# Patient Record
Sex: MALE | Race: WHITE | NOT HISPANIC OR LATINO | Employment: UNEMPLOYED | URBAN - METROPOLITAN AREA
[De-identification: names, ages, dates, MRNs, and addresses within clinical notes are randomized per-mention and may not be internally consistent; named-entity substitution may affect disease eponyms.]

---

## 2018-07-11 ENCOUNTER — OFFICE VISIT (OUTPATIENT)
Dept: FAMILY MEDICINE CLINIC | Facility: CLINIC | Age: 23
End: 2018-07-11

## 2018-07-11 VITALS
DIASTOLIC BLOOD PRESSURE: 80 MMHG | OXYGEN SATURATION: 99 % | HEART RATE: 71 BPM | HEIGHT: 70 IN | BODY MASS INDEX: 30.86 KG/M2 | WEIGHT: 215.6 LBS | SYSTOLIC BLOOD PRESSURE: 120 MMHG | RESPIRATION RATE: 16 BRPM | TEMPERATURE: 99.3 F

## 2018-07-11 DIAGNOSIS — M25.511 RIGHT SHOULDER PAIN, UNSPECIFIED CHRONICITY: Primary | ICD-10-CM

## 2018-07-11 PROCEDURE — 73030 X-RAY EXAM OF SHOULDER: CPT | Performed by: NURSE PRACTITIONER

## 2018-07-11 PROCEDURE — 99213 OFFICE O/P EST LOW 20 MIN: CPT | Performed by: NURSE PRACTITIONER

## 2018-07-11 RX ORDER — CETIRIZINE HYDROCHLORIDE 10 MG/1
10 TABLET ORAL DAILY
COMMUNITY

## 2018-07-11 RX ORDER — IBUPROFEN 800 MG/1
800 TABLET ORAL EVERY 6 HOURS PRN
Qty: 60 TABLET | Refills: 0 | Status: SHIPPED | OUTPATIENT
Start: 2018-07-11

## 2018-07-11 NOTE — PATIENT INSTRUCTIONS
Right shoulder xray today, will call with results. Copy given to patient.   Refer to ortho will call with appt.   Encouraged rest, ice, ibuprofen 800mg every 8 hours as needed for pain, avoid OTC NSAIDS.   Avoid over head or heavy lifting.   Increase fluid intake, get plenty of rest.   Patient agrees with plan of care and understands instructions. Call if worsening symptoms or any problems or concerns.

## 2018-07-16 ENCOUNTER — TELEPHONE (OUTPATIENT)
Dept: FAMILY MEDICINE CLINIC | Facility: CLINIC | Age: 23
End: 2018-07-16

## 2018-07-17 ENCOUNTER — TELEPHONE (OUTPATIENT)
Dept: FAMILY MEDICINE CLINIC | Facility: CLINIC | Age: 23
End: 2018-07-17

## 2018-07-17 NOTE — TELEPHONE ENCOUNTER
----- Message from MADDIE Archer sent at 7/16/2018  8:18 AM EDT -----  Please call patient with results. Shoulder xray is normal, cont f/u with ortho.    [facility] informed of Xray results

## 2022-06-30 NOTE — PROGRESS NOTES
Subjective   Javad Bedoya is a 22 y.o. male.     History of Present Illness   C/o right shoulder pain, started about 2 years ago in high school. Tried rest ice ibuprofen, he states he started lifting weights on regular program, he states chest press and shoulder press causes pain, started back a couple of weeks ago, does not see ortho. He states hurts to lift certain things. He left handed. He has dull pain with movement. He has tried shoulder exercises but does not help. He played football in high school. He states arm bent back and twisted shoulder.     The following portions of the patient's history were reviewed and updated as appropriate: allergies, current medications, past family history, past medical history, past social history, past surgical history and problem list.    Review of Systems   Constitutional: Negative for chills, diaphoresis and fever.   Respiratory: Negative for cough and shortness of breath.    Cardiovascular: Negative for chest pain.   Musculoskeletal: Positive for arthralgias and myalgias.   Skin: Negative for pallor.   Neurological: Negative for dizziness, light-headedness and headache.   All other systems reviewed and are negative.      Objective   Physical Exam   Constitutional: He is oriented to person, place, and time. He appears well-developed and well-nourished.   HENT:   Head: Normocephalic.   Eyes: Pupils are equal, round, and reactive to light.   Neck: Normal range of motion.   Cardiovascular: Normal rate, regular rhythm, normal heart sounds and intact distal pulses.    Pulmonary/Chest: Effort normal and breath sounds normal.   Musculoskeletal:        Right shoulder: He exhibits decreased range of motion, tenderness, bony tenderness and decreased strength. He exhibits no swelling, no effusion, no crepitus, no deformity, no pain, no spasm and normal pulse.        Left shoulder: Normal.   Lymphadenopathy:     He has no cervical adenopathy.   Neurological: He is alert and oriented to  person, place, and time.   Skin: Skin is warm and dry.   Psychiatric: He has a normal mood and affect. His behavior is normal.   Nursing note and vitals reviewed.    Right shoulder xray today for pain, no comparison, shows NAD, awaiting radiology over read.     Assessment/Plan   Javad was seen today for shoulder pain.    Diagnoses and all orders for this visit:    Right shoulder pain, unspecified chronicity  -     XR Shoulder 2+ View Right (In Office)  -     Ambulatory Referral to Orthopedic Surgery    Other orders  -     ibuprofen (ADVIL,MOTRIN) 800 MG tablet; Take 1 tablet by mouth Every 6 (Six) Hours As Needed for Mild Pain .      Right shoulder xray today, will call with results. Copy given to patient.   Refer to ortho will call with appt.   Encouraged rest, ice, ibuprofen 800mg every 8 hours as needed for pain, avoid OTC NSAIDS.   Avoid over head or heavy lifting.   Increase fluid intake, get plenty of rest.   Patient agrees with plan of care and understands instructions. Call if worsening symptoms or any problems or concerns.             Anesthesia Volume In Cc: 6

## 2025-03-21 NOTE — TELEPHONE ENCOUNTER
Problem: Postoperative Care  Goal: Elimination status is maintained/returned to baseline  Outcome: Monitoring/Evaluating progress  Goal: Activity level is resumed to level needed for d/c  Outcome: Monitoring/Evaluating progress  Goal: Verbalizes understanding of postoperative care in the hospital and after d/c  Description: Document on Patient Education Activity  Outcome: Monitoring/Evaluating progress     Problem: Pain  Goal: Acceptable pain level achieved/maintained at rest using appropriate pain scale for the patient  Outcome: Monitoring/Evaluating progress  Goal: Acceptable pain level achieved/maintained with activity using appropriate pain scale for the patient  Outcome: Monitoring/Evaluating progress     Problem: At Risk for Falls  Goal: Patient does not fall  Outcome: Monitoring/Evaluating progress  Goal: Patient takes action to control fall-related risks  Outcome: Monitoring/Evaluating progress     Problem: VTE (Actual)  Goal: Patient maintains mobility and remains free from complications of VTE  Outcome: Monitoring/Evaluating progress     Problem: Impaired Physical Mobility  Goal: Functional status is maintained or returned to baseline during hospitalization  Outcome: Monitoring/Evaluating progress  Goal: Tolerates activity for discharge setting with no abnormal symptoms  Outcome: Monitoring/Evaluating progress     Problem: Cervical Spine Surgery  Goal: Elimination status is maintained/returned to baseline  Outcome: Monitoring/Evaluating progress  Goal: Neurological function is maintained/restored to baseline  Outcome: Monitoring/Evaluating progress  Goal: Mobility and self-care ability maintained/returned to baseline  Outcome: Monitoring/Evaluating progress  Goal: Verbalizes understanding of post-op cervical spine surgery care  Outcome: Monitoring/Evaluating progress     Problem: Urinary Incontinence  Goal: # Fewer urinary incontinence episodes per day  Description: Select this goal if  goal is reduced  ----- Message from MADDIE Archer sent at 7/16/2018  8:18 AM EDT -----  Please call patient with results. Shoulder xray is normal, cont f/u with ortho.    [facility]   incontinence (patient unable to control).  Outcome: Monitoring/Evaluating progress  Goal: Zero or no urinary incontinence episodes per day  Description: Select this row if patient goal is no incontinenceSelect this goal if patient goal is no incontinence.  Outcome: Monitoring/Evaluating progress  Goal: #Verbalizes urinary incontinence management & associated care  Description: Document on Patient Education Activity  Outcome: Monitoring/Evaluating progress     Problem: Self Care Deficit  Goal: # Functional status is maintained or returned to baseline - REHAB ONLY  Outcome: Monitoring/Evaluating progress  Goal: Functional status is maintained or returned to baseline  Outcome: Monitoring/Evaluating progress  Goal: # Tolerates activity for d/c setting with no clinical problems  Outcome: Monitoring/Evaluating progress